# Patient Record
Sex: FEMALE | Race: OTHER | NOT HISPANIC OR LATINO | URBAN - METROPOLITAN AREA
[De-identification: names, ages, dates, MRNs, and addresses within clinical notes are randomized per-mention and may not be internally consistent; named-entity substitution may affect disease eponyms.]

---

## 2017-07-26 ENCOUNTER — EMERGENCY (EMERGENCY)
Facility: HOSPITAL | Age: 32
LOS: 1 days | Discharge: HOME | End: 2017-07-26
Attending: EMERGENCY MEDICINE | Admitting: EMERGENCY MEDICINE
Payer: COMMERCIAL

## 2017-07-26 VITALS
SYSTOLIC BLOOD PRESSURE: 124 MMHG | RESPIRATION RATE: 16 BRPM | DIASTOLIC BLOOD PRESSURE: 87 MMHG | TEMPERATURE: 98 F | HEART RATE: 89 BPM | OXYGEN SATURATION: 100 %

## 2017-07-26 DIAGNOSIS — Z91.018 ALLERGY TO OTHER FOODS: ICD-10-CM

## 2017-07-26 DIAGNOSIS — H10.31 UNSPECIFIED ACUTE CONJUNCTIVITIS, RIGHT EYE: ICD-10-CM

## 2017-07-26 DIAGNOSIS — H57.11 OCULAR PAIN, RIGHT EYE: ICD-10-CM

## 2017-07-26 PROCEDURE — 99283 EMERGENCY DEPT VISIT LOW MDM: CPT

## 2017-07-26 RX ORDER — IBUPROFEN 200 MG
600 TABLET ORAL ONCE
Qty: 0 | Refills: 0 | Status: COMPLETED | OUTPATIENT
Start: 2017-07-26 | End: 2017-07-26

## 2017-07-26 RX ADMIN — Medication 600 MILLIGRAM(S): at 20:47

## 2017-07-26 NOTE — ED PROVIDER NOTE - OBJECTIVE STATEMENT
patient with hx of lasix 5 yrs ago, wears cosmetic contact, notes last night fell asleep with contact, awakening with redness, tearing and pain to R eye. associated with dc. denies facial swelling. denies hx of corneal abrasion. denies blurry vision.

## 2017-07-28 ENCOUNTER — MEDICATION RENEWAL (OUTPATIENT)
Age: 32
End: 2017-07-28

## 2017-07-28 ENCOUNTER — APPOINTMENT (OUTPATIENT)
Dept: OPHTHALMOLOGY | Facility: CLINIC | Age: 32
End: 2017-07-28
Payer: MEDICAID

## 2017-07-28 DIAGNOSIS — S05.01XA INJURY OF CONJUNCTIVA AND CORNEAL ABRASION W/OUT FOREIGN BODY, RIGHT EYE, INITIAL ENCOUNTER: ICD-10-CM

## 2017-07-28 PROCEDURE — 92004 COMPRE OPH EXAM NEW PT 1/>: CPT

## 2017-07-28 PROCEDURE — 92285 EXTERNAL OCULAR PHOTOGRAPHY: CPT

## 2017-07-28 RX ORDER — CYCLOPENTOLATE HYDROCHLORIDE 10 MG/ML
1 SOLUTION OPHTHALMIC 3 TIMES DAILY
Qty: 5 | Refills: 1 | Status: ACTIVE | COMMUNITY
Start: 2017-07-28 | End: 1900-01-01

## 2017-07-28 RX ORDER — MOXIFLOXACIN HYDROCHLORIDE 5 MG/ML
0.5 SOLUTION/ DROPS OPHTHALMIC
Qty: 1 | Refills: 1 | Status: ACTIVE | COMMUNITY
Start: 2017-07-28 | End: 1900-01-01

## 2017-07-28 RX ORDER — PREDNISOLONE ACETATE 10 MG/ML
1 SUSPENSION/ DROPS OPHTHALMIC
Qty: 1 | Refills: 2 | Status: ACTIVE | COMMUNITY
Start: 2017-07-28 | End: 1900-01-01

## 2017-08-01 ENCOUNTER — APPOINTMENT (OUTPATIENT)
Dept: OPHTHALMOLOGY | Facility: CLINIC | Age: 32
End: 2017-08-01
Payer: MEDICAID

## 2017-08-01 DIAGNOSIS — H16.8 OTHER KERATITIS: ICD-10-CM

## 2017-08-01 PROCEDURE — 92012 INTRM OPH EXAM EST PATIENT: CPT

## 2017-08-23 ENCOUNTER — APPOINTMENT (OUTPATIENT)
Dept: OPHTHALMOLOGY | Facility: CLINIC | Age: 32
End: 2017-08-23

## 2019-02-07 ENCOUNTER — APPOINTMENT (OUTPATIENT)
Dept: OPHTHALMOLOGY | Facility: CLINIC | Age: 34
End: 2019-02-07
Payer: COMMERCIAL

## 2019-02-07 PROCEDURE — 92012 INTRM OPH EXAM EST PATIENT: CPT

## 2019-02-07 RX ORDER — PREDNISOLONE ACETATE 10 MG/ML
1 SUSPENSION/ DROPS OPHTHALMIC
Qty: 1 | Refills: 3 | Status: ACTIVE | COMMUNITY
Start: 2019-02-07 | End: 1900-01-01

## 2019-02-11 ENCOUNTER — APPOINTMENT (OUTPATIENT)
Dept: OPHTHALMOLOGY | Facility: CLINIC | Age: 34
End: 2019-02-11
Payer: COMMERCIAL

## 2019-02-11 DIAGNOSIS — Z98.890 OTHER SPECIFIED POSTPROCEDURAL STATES: ICD-10-CM

## 2019-02-11 DIAGNOSIS — H40.003 PREGLAUCOMA, UNSPECIFIED, BILATERAL: ICD-10-CM

## 2019-02-11 PROCEDURE — 92133 CPTRZD OPH DX IMG PST SGM ON: CPT

## 2019-02-11 PROCEDURE — 92014 COMPRE OPH EXAM EST PT 1/>: CPT

## 2019-02-11 PROCEDURE — 92012 INTRM OPH EXAM EST PATIENT: CPT

## 2019-02-11 PROCEDURE — 76514 ECHO EXAM OF EYE THICKNESS: CPT

## 2019-02-11 PROCEDURE — 92020 GONIOSCOPY: CPT

## 2019-02-11 PROCEDURE — 92083 EXTENDED VISUAL FIELD XM: CPT

## 2019-02-11 RX ORDER — ERYTHROMYCIN 5 MG/G
5 OINTMENT OPHTHALMIC
Qty: 1 | Refills: 3 | Status: ACTIVE | COMMUNITY
Start: 2019-02-11 | End: 1900-01-01

## 2019-02-12 PROBLEM — Z98.890 S/P LASIK SURGERY: Status: ACTIVE | Noted: 2019-02-12

## 2019-03-21 ENCOUNTER — APPOINTMENT (OUTPATIENT)
Age: 34
End: 2019-03-21
Payer: COMMERCIAL

## 2019-03-21 DIAGNOSIS — H15.101 UNSPECIFIED EPISCLERITIS, RIGHT EYE: ICD-10-CM

## 2019-03-21 PROCEDURE — 92012 INTRM OPH EXAM EST PATIENT: CPT

## 2019-07-15 ENCOUNTER — APPOINTMENT (OUTPATIENT)
Dept: OPHTHALMOLOGY | Facility: CLINIC | Age: 34
End: 2019-07-15
Payer: COMMERCIAL

## 2019-07-15 ENCOUNTER — NON-APPOINTMENT (OUTPATIENT)
Age: 34
End: 2019-07-15

## 2019-07-15 PROCEDURE — 92250 FUNDUS PHOTOGRAPHY W/I&R: CPT

## 2019-07-15 PROCEDURE — 92083 EXTENDED VISUAL FIELD XM: CPT

## 2019-07-15 PROCEDURE — 92012 INTRM OPH EXAM EST PATIENT: CPT

## 2020-01-17 ENCOUNTER — APPOINTMENT (OUTPATIENT)
Dept: OPHTHALMOLOGY | Facility: CLINIC | Age: 35
End: 2020-01-17
Payer: COMMERCIAL

## 2020-01-17 ENCOUNTER — NON-APPOINTMENT (OUTPATIENT)
Age: 35
End: 2020-01-17

## 2020-01-17 PROCEDURE — 92012 INTRM OPH EXAM EST PATIENT: CPT

## 2020-01-17 PROCEDURE — 92083 EXTENDED VISUAL FIELD XM: CPT

## 2020-01-17 PROCEDURE — 92133 CPTRZD OPH DX IMG PST SGM ON: CPT

## 2020-07-29 ENCOUNTER — NON-APPOINTMENT (OUTPATIENT)
Age: 35
End: 2020-07-29

## 2020-07-29 ENCOUNTER — APPOINTMENT (OUTPATIENT)
Age: 35
End: 2020-07-29
Payer: COMMERCIAL

## 2020-07-29 PROCEDURE — 92250 FUNDUS PHOTOGRAPHY W/I&R: CPT

## 2020-07-29 PROCEDURE — 92083 EXTENDED VISUAL FIELD XM: CPT

## 2020-07-29 PROCEDURE — 92020 GONIOSCOPY: CPT

## 2020-07-29 PROCEDURE — 92014 COMPRE OPH EXAM EST PT 1/>: CPT

## 2021-02-01 ENCOUNTER — APPOINTMENT (OUTPATIENT)
Dept: OPHTHALMOLOGY | Facility: CLINIC | Age: 36
End: 2021-02-01

## 2021-02-04 ENCOUNTER — APPOINTMENT (OUTPATIENT)
Dept: OPHTHALMOLOGY | Facility: CLINIC | Age: 36
End: 2021-02-04
Payer: COMMERCIAL

## 2021-02-04 ENCOUNTER — NON-APPOINTMENT (OUTPATIENT)
Age: 36
End: 2021-02-04

## 2021-02-04 PROCEDURE — 92020 GONIOSCOPY: CPT

## 2021-02-04 PROCEDURE — 99072 ADDL SUPL MATRL&STAF TM PHE: CPT

## 2021-02-04 PROCEDURE — 92133 CPTRZD OPH DX IMG PST SGM ON: CPT

## 2021-02-04 PROCEDURE — 92014 COMPRE OPH EXAM EST PT 1/>: CPT

## 2021-02-04 PROCEDURE — 92083 EXTENDED VISUAL FIELD XM: CPT

## 2021-08-20 ENCOUNTER — APPOINTMENT (OUTPATIENT)
Dept: OPHTHALMOLOGY | Facility: CLINIC | Age: 36
End: 2021-08-20
Payer: COMMERCIAL

## 2021-08-20 ENCOUNTER — NON-APPOINTMENT (OUTPATIENT)
Age: 36
End: 2021-08-20

## 2021-08-20 PROCEDURE — 92083 EXTENDED VISUAL FIELD XM: CPT

## 2021-08-20 PROCEDURE — 92250 FUNDUS PHOTOGRAPHY W/I&R: CPT

## 2021-08-20 PROCEDURE — 92020 GONIOSCOPY: CPT

## 2021-08-20 PROCEDURE — 92014 COMPRE OPH EXAM EST PT 1/>: CPT

## 2022-03-11 ENCOUNTER — APPOINTMENT (OUTPATIENT)
Dept: OPHTHALMOLOGY | Facility: CLINIC | Age: 37
End: 2022-03-11

## 2022-05-20 ENCOUNTER — NON-APPOINTMENT (OUTPATIENT)
Age: 37
End: 2022-05-20

## 2022-05-20 ENCOUNTER — APPOINTMENT (OUTPATIENT)
Dept: OPHTHALMOLOGY | Facility: CLINIC | Age: 37
End: 2022-05-20
Payer: COMMERCIAL

## 2022-05-20 PROCEDURE — 92012 INTRM OPH EXAM EST PATIENT: CPT

## 2022-05-20 PROCEDURE — 92133 CPTRZD OPH DX IMG PST SGM ON: CPT

## 2022-05-20 PROCEDURE — 92083 EXTENDED VISUAL FIELD XM: CPT

## 2022-07-19 PROBLEM — H40.003 GLAUCOMA SUSPECT OF BOTH EYES: Status: ACTIVE | Noted: 2019-02-12

## 2023-05-12 ENCOUNTER — APPOINTMENT (OUTPATIENT)
Dept: OPHTHALMOLOGY | Facility: CLINIC | Age: 38
End: 2023-05-12
Payer: COMMERCIAL

## 2023-05-12 ENCOUNTER — NON-APPOINTMENT (OUTPATIENT)
Age: 38
End: 2023-05-12

## 2023-05-12 PROCEDURE — 92083 EXTENDED VISUAL FIELD XM: CPT

## 2023-05-12 PROCEDURE — 92250 FUNDUS PHOTOGRAPHY W/I&R: CPT

## 2023-05-12 PROCEDURE — 92012 INTRM OPH EXAM EST PATIENT: CPT

## 2024-03-28 ENCOUNTER — APPOINTMENT (OUTPATIENT)
Dept: OPHTHALMOLOGY | Facility: CLINIC | Age: 39
End: 2024-03-28
Payer: COMMERCIAL

## 2024-03-28 ENCOUNTER — NON-APPOINTMENT (OUTPATIENT)
Age: 39
End: 2024-03-28

## 2024-03-28 PROCEDURE — 92012 INTRM OPH EXAM EST PATIENT: CPT

## 2024-04-01 ENCOUNTER — NON-APPOINTMENT (OUTPATIENT)
Age: 39
End: 2024-04-01

## 2024-04-01 ENCOUNTER — APPOINTMENT (OUTPATIENT)
Dept: OPHTHALMOLOGY | Facility: CLINIC | Age: 39
End: 2024-04-01
Payer: COMMERCIAL

## 2024-04-01 PROCEDURE — 92012 INTRM OPH EXAM EST PATIENT: CPT

## 2024-05-13 ENCOUNTER — APPOINTMENT (OUTPATIENT)
Dept: OPHTHALMOLOGY | Facility: CLINIC | Age: 39
End: 2024-05-13

## 2024-05-15 ENCOUNTER — NON-APPOINTMENT (OUTPATIENT)
Age: 39
End: 2024-05-15

## 2024-05-15 ENCOUNTER — APPOINTMENT (OUTPATIENT)
Dept: OPHTHALMOLOGY | Facility: CLINIC | Age: 39
End: 2024-05-15
Payer: COMMERCIAL

## 2024-05-15 PROCEDURE — 92133 CPTRZD OPH DX IMG PST SGM ON: CPT

## 2024-05-15 PROCEDURE — 92083 EXTENDED VISUAL FIELD XM: CPT

## 2024-05-15 PROCEDURE — 92012 INTRM OPH EXAM EST PATIENT: CPT

## 2025-05-19 ENCOUNTER — APPOINTMENT (OUTPATIENT)
Dept: OPHTHALMOLOGY | Facility: CLINIC | Age: 40
End: 2025-05-19